# Patient Record
Sex: MALE | Race: WHITE | Employment: OTHER
[De-identification: names, ages, dates, MRNs, and addresses within clinical notes are randomized per-mention and may not be internally consistent; named-entity substitution may affect disease eponyms.]

---

## 2023-02-15 ENCOUNTER — HOSPITAL ENCOUNTER (EMERGENCY)
Facility: HOSPITAL | Age: 28
Discharge: HOME | End: 2023-02-16
Attending: EMERGENCY MEDICINE | Admitting: EMERGENCY MEDICINE
Payer: OTHER GOVERNMENT

## 2023-02-15 DIAGNOSIS — E86.0 DEHYDRATION: ICD-10-CM

## 2023-02-15 DIAGNOSIS — R10.13 ABDOMINAL PAIN, EPIGASTRIC: Primary | ICD-10-CM

## 2023-02-15 LAB
ALBUMIN SERPL-MCNC: 4.9 G/DL (ref 3.4–5)
ALBUMIN/GLOB SERPL: 1.4 (ref 0.8–1.7)
ALP SERPL-CCNC: 89 U/L (ref 45–117)
ALT SERPL-CCNC: 42 U/L (ref 16–61)
ANION GAP SERPL CALC-SCNC: 6 MMOL/L (ref 3–18)
APPEARANCE UR: CLEAR
AST SERPL-CCNC: 16 U/L (ref 10–38)
BASOPHILS # BLD: 0 K/UL (ref 0–0.1)
BASOPHILS NFR BLD: 0 % (ref 0–2)
BILIRUB SERPL-MCNC: 1.8 MG/DL (ref 0.2–1)
BILIRUB UR QL: NEGATIVE
BUN SERPL-MCNC: 11 MG/DL (ref 7–18)
BUN/CREAT SERPL: 10 (ref 12–20)
CALCIUM SERPL-MCNC: 10 MG/DL (ref 8.5–10.1)
CHLORIDE SERPL-SCNC: 106 MMOL/L (ref 100–111)
CO2 SERPL-SCNC: 30 MMOL/L (ref 21–32)
COLOR UR: YELLOW
CREAT SERPL-MCNC: 1.08 MG/DL (ref 0.6–1.3)
DIFFERENTIAL METHOD BLD: ABNORMAL
EOSINOPHIL # BLD: 0 K/UL (ref 0–0.4)
EOSINOPHIL NFR BLD: 1 % (ref 0–5)
ERYTHROCYTE [DISTWIDTH] IN BLOOD BY AUTOMATED COUNT: 13 % (ref 11.6–14.5)
GLOBULIN SER CALC-MCNC: 3.4 G/DL (ref 2–4)
GLUCOSE SERPL-MCNC: 91 MG/DL (ref 74–99)
GLUCOSE UR STRIP.AUTO-MCNC: NEGATIVE MG/DL
HCT VFR BLD AUTO: 50.4 % (ref 36–48)
HGB BLD-MCNC: 17 G/DL (ref 13–16)
HGB UR QL STRIP: NEGATIVE
IMM GRANULOCYTES # BLD AUTO: 0 K/UL (ref 0–0.04)
IMM GRANULOCYTES NFR BLD AUTO: 1 % (ref 0–0.5)
KETONES UR QL STRIP.AUTO: NEGATIVE MG/DL
LEUKOCYTE ESTERASE UR QL STRIP.AUTO: NEGATIVE
LIPASE SERPL-CCNC: 87 U/L (ref 73–393)
LYMPHOCYTES # BLD: 1.9 K/UL (ref 0.9–3.6)
LYMPHOCYTES NFR BLD: 25 % (ref 21–52)
MAGNESIUM SERPL-MCNC: 2.2 MG/DL (ref 1.6–2.6)
MCH RBC QN AUTO: 29 PG (ref 24–34)
MCHC RBC AUTO-ENTMCNC: 33.7 G/DL (ref 31–37)
MCV RBC AUTO: 85.9 FL (ref 78–100)
MONOCYTES # BLD: 0.8 K/UL (ref 0.05–1.2)
MONOCYTES NFR BLD: 11 % (ref 3–10)
NEUTS SEG # BLD: 4.7 K/UL (ref 1.8–8)
NEUTS SEG NFR BLD: 62 % (ref 40–73)
NITRITE UR QL STRIP.AUTO: NEGATIVE
NRBC # BLD: 0 K/UL (ref 0–0.01)
NRBC BLD-RTO: 0 PER 100 WBC
PH UR STRIP: 7.5 (ref 5–8)
PLATELET # BLD AUTO: 264 K/UL (ref 135–420)
PMV BLD AUTO: 10.9 FL (ref 9.2–11.8)
POTASSIUM SERPL-SCNC: 3.8 MMOL/L (ref 3.5–5.5)
PROT SERPL-MCNC: 8.3 G/DL (ref 6.4–8.2)
PROT UR STRIP-MCNC: NEGATIVE MG/DL
RBC # BLD AUTO: 5.87 M/UL (ref 4.35–5.65)
SODIUM SERPL-SCNC: 142 MMOL/L (ref 136–145)
SP GR UR REFRACTOMETRY: 1.01 (ref 1–1.03)
UROBILINOGEN UR QL STRIP.AUTO: 0.2 EU/DL (ref 0.2–1)
WBC # BLD AUTO: 7.5 K/UL (ref 4.6–13.2)

## 2023-02-15 PROCEDURE — 99284 EMERGENCY DEPT VISIT MOD MDM: CPT

## 2023-02-15 PROCEDURE — 2580000003 HC RX 258: Performed by: EMERGENCY MEDICINE

## 2023-02-15 PROCEDURE — 96360 HYDRATION IV INFUSION INIT: CPT

## 2023-02-15 PROCEDURE — 85025 COMPLETE CBC W/AUTO DIFF WBC: CPT

## 2023-02-15 PROCEDURE — 83735 ASSAY OF MAGNESIUM: CPT

## 2023-02-15 PROCEDURE — 80053 COMPREHEN METABOLIC PANEL: CPT

## 2023-02-15 PROCEDURE — 83690 ASSAY OF LIPASE: CPT

## 2023-02-15 PROCEDURE — 81003 URINALYSIS AUTO W/O SCOPE: CPT

## 2023-02-15 RX ORDER — 0.9 % SODIUM CHLORIDE 0.9 %
1000 INTRAVENOUS SOLUTION INTRAVENOUS ONCE
Status: DISCONTINUED | OUTPATIENT
Start: 2023-02-16 | End: 2023-02-15

## 2023-02-15 RX ORDER — ONDANSETRON 2 MG/ML
4 INJECTION INTRAMUSCULAR; INTRAVENOUS
Status: DISCONTINUED | OUTPATIENT
Start: 2023-02-15 | End: 2023-02-16 | Stop reason: HOSPADM

## 2023-02-15 RX ORDER — MORPHINE SULFATE 2 MG/ML
2 INJECTION, SOLUTION INTRAMUSCULAR; INTRAVENOUS
Status: DISCONTINUED | OUTPATIENT
Start: 2023-02-15 | End: 2023-02-16 | Stop reason: HOSPADM

## 2023-02-15 RX ORDER — 0.9 % SODIUM CHLORIDE 0.9 %
1000 INTRAVENOUS SOLUTION INTRAVENOUS ONCE
Status: COMPLETED | OUTPATIENT
Start: 2023-02-15 | End: 2023-02-15

## 2023-02-15 RX ADMIN — SODIUM CHLORIDE 1000 ML: 9 INJECTION, SOLUTION INTRAVENOUS at 20:29

## 2023-02-15 ASSESSMENT — ENCOUNTER SYMPTOMS
SHORTNESS OF BREATH: 0
VOMITING: 0
ALLERGIC/IMMUNOLOGIC NEGATIVE: 1
EYES NEGATIVE: 1
BLOOD IN STOOL: 0
ABDOMINAL PAIN: 1
DIARRHEA: 0
CONSTIPATION: 0

## 2023-02-15 ASSESSMENT — LIFESTYLE VARIABLES
HOW MANY STANDARD DRINKS CONTAINING ALCOHOL DO YOU HAVE ON A TYPICAL DAY: PATIENT DOES NOT DRINK
HOW OFTEN DO YOU HAVE A DRINK CONTAINING ALCOHOL: NEVER

## 2023-02-16 VITALS
HEART RATE: 68 BPM | DIASTOLIC BLOOD PRESSURE: 81 MMHG | HEIGHT: 70 IN | TEMPERATURE: 97.5 F | BODY MASS INDEX: 23.77 KG/M2 | RESPIRATION RATE: 18 BRPM | SYSTOLIC BLOOD PRESSURE: 133 MMHG | WEIGHT: 166 LBS | OXYGEN SATURATION: 98 %

## 2023-02-16 ASSESSMENT — PAIN - FUNCTIONAL ASSESSMENT: PAIN_FUNCTIONAL_ASSESSMENT: NONE - DENIES PAIN

## 2023-02-16 NOTE — ED NOTES
Pt d/c with instructions and ambulatory. Pt instructed to f/u with gi; no additional questions at this time. No redness or swelling at iv site. Pt vss and documented.       Sarah Kelley RN  02/16/23 3661

## 2023-02-16 NOTE — ED NOTES
Pt resting on stretcher talking with family;nad noted. Pt vs updated.  Pt awaiting re eval.      Mati John RN  02/15/23 2123

## 2023-02-16 NOTE — ED TRIAGE NOTES
Pt arrived with c.o \"feeling off\", pt describes his symptoms as \"feels foggy, sort of dizzy and gut pain\". Pt reports he went to [facility] and had pressure checked and it was \"high, 145/118\", BP in triage 175/96. Denies CP/SOB  Denies HA.

## 2023-02-16 NOTE — ED NOTES
Pt states he feels better after receiving ns ivf bolus.  Pt vs documented      Delgado Sanchez RN  02/15/23 5072

## 2023-02-16 NOTE — ED PROVIDER NOTES
[facility] EMERGENCY DEPT  EMERGENCY DEPARTMENT ENCOUNTER      Pt Name: Earnest Acevedo  MRN: [id number]  [place] 1995  Date of evaluation: 2/15/2023  Provider: Anabella Martinez [facility]       Chief Complaint   Patient presents with    Other         HISTORY OF PRESENT ILLNESS   (Location/Symptom, Timing/Onset, Context/Setting, Quality, Duration, Modifying Factors, Severity)  Note limiting factors. Earnest Acevedo is a 32 y.o. male who presents to the emergency department complaint of several days of abdominal pain nauseousness decreased appetite slightly and then he said he found out that his blood pressure was \"through the roof\" today. Denies history of hypertension prior abdominal surgeries nausea vomiting or diarrheal stools. He had low potassium once previously when he self felt somewhat similar about a year and a half ago. Denies melena hematochezia traumatic tobacco alcohol or illicits. Denies dysuria. HPI    Nursing Notes were reviewed. REVIEW OF SYSTEMS    (2-9 systems for level 4, 10 or more for level 5)     Review of Systems   Constitutional:  Negative for fever. HENT: Negative. Eyes: Negative. Respiratory:  Negative for shortness of breath. Cardiovascular:  Negative for chest pain. Gastrointestinal:  Positive for abdominal pain. Negative for blood in stool, constipation, diarrhea and vomiting. Endocrine: Negative. Genitourinary:  Negative for flank pain and frequency. Musculoskeletal: Negative. Skin: Negative. Allergic/Immunologic: Negative. Neurological: Negative. Hematological:  Does not bruise/bleed easily. Psychiatric/Behavioral: Negative. Except as noted above the remainder of the review of systems was reviewed and negative. PAST MEDICAL HISTORY   No past medical history on file. SURGICAL HISTORY     No past surgical history on file.       CURRENT MEDICATIONS       Previous Medications    No medications on file ALLERGIES     Patient has no known allergies. FAMILY HISTORY     No family history on file. SOCIAL HISTORY       Social History     Socioeconomic History    Marital status:        SCREENINGS         [place] Coma Scale  Eye Opening: Spontaneous  Best Verbal Response: Oriented  Best Motor Response: Obeys commands  Gonzalo Coma Scale Score: 15                     CIWA Assessment  BP: (!) 151/82  Heart Rate: 82                 PHYSICAL EXAM    (up to 7 for level 4, 8 or more for level 5)     ED Triage Vitals [02/15/23 1909]   BP Temp Temp Source Heart Rate Resp SpO2 Height Weight   (!) 175/96 97.5 °F (36.4 °C) Temporal 98 20 100 % 5' 10\" (1.778 m) 166 lb (75.3 kg)       Physical Exam  Vitals and nursing note reviewed. Constitutional:       General: He is in acute distress. Appearance: Normal appearance. He is normal weight. HENT:      Head: Normocephalic. Nose: Nose normal.      Mouth/Throat:      Mouth: Mucous membranes are moist.   Eyes:      Extraocular Movements: Extraocular movements intact. Cardiovascular:      Rate and Rhythm: Normal rate and regular rhythm. Pulses: Normal pulses. Heart sounds: Normal heart sounds. Pulmonary:      Effort: Pulmonary effort is normal.      Breath sounds: Normal breath sounds. Abdominal:      Tenderness: There is abdominal tenderness. Comments: Epigastric, right lower, left lower quadrant tenderness. Musculoskeletal:         General: No deformity. Cervical back: Normal range of motion and neck supple. No rigidity. Skin:     General: Skin is warm. Findings: No rash. Neurological:      Mental Status: He is alert and oriented to person, place, and time. Psychiatric:         Mood and Affect: Mood normal.         Behavior: Behavior normal.         Thought Content:  Thought content normal.         Judgment: Judgment normal.       DIAGNOSTIC RESULTS     EKG: All EKG's are interpreted by the Emergency Department Physician who either signs or Co-signs this chart in the absence of a cardiologist.      RADIOLOGY:   Non-plain film images such as CT, Ultrasound and MRI are read by the radiologist. Plain radiographic images are visualized and preliminarily interpreted by the emergency physician with the below findings:    Interpretation per the Radiologist below, if available at the time of this note:    No orders to display         ED BEDSIDE ULTRASOUND:   Performed by ED Physician - none    LABS:  Labs Reviewed   CBC WITH AUTO DIFFERENTIAL - Abnormal; Notable for the following components:       Result Value    RBC 5.87 (*)     Hemoglobin 17.0 (*)     Hematocrit 50.4 (*)     Monocytes 11 (*)     Immature Granulocytes 1 (*)     All other components within normal limits   COMPREHENSIVE METABOLIC PANEL - Abnormal; Notable for the following components:    Bun/Cre Ratio 10 (*)     Total Bilirubin 1.8 (*)     Total Protein 8.3 (*)     All other components within normal limits   LIPASE   MAGNESIUM   URINALYSIS       All other labs were within normal range or not returned as of this dictation. EMERGENCY DEPARTMENT COURSE and DIFFERENTIAL DIAGNOSIS/MDM:   Vitals:    Vitals:    02/15/23 2005 02/15/23 2007 02/15/23 2124 02/15/23 2239   BP: (!) 179/82 (!) 147/84  (!) 151/82   Pulse: (!) 101 87  82   Resp: 18 18 18 18   Temp:       TempSrc:       SpO2: 99% 98% 98% 98%   Weight: 166 lb (75.3 kg)      Height: 5' 10\" (1.778 m)            Medical Decision Making  Amount and/or Complexity of Data Reviewed  Labs: ordered. Radiology: ordered. Risk  Prescription drug management. Pt with stable bilirubin and potassium; no longer having pain s/pIVF. Said last year he had a CT scan and an ultrasound evaluating his abdomen and gallbladder both were normal.  His LFTs are otherwise unremarkable. Patient is no longer having any abdominal discomfort.   He describes the pain is radiating up into his chest from his abdomen and has Protonix available to him he just has not been taking it. Encouraged him to take omeprazole follow-up with GI and patient to be discharged PT CT scan has been removed as patient is no longer symptomatic. Discussed triggers for peptic ulcer disease and reflux which patient likely has including stressors as triggers. Has had endoscopic. REASSESSMENT          CRITICAL CARE TIME       CONSULTS:  None    PROCEDURES:  Unless otherwise noted below, none     Procedures      FINAL IMPRESSION      1. Abdominal pain, epigastric    2. Dehydration          DISPOSITION/PLAN   DISPOSITION Decision To Discharge 02/15/2023 11:57:29 PM      PATIENT REFERRED TO:  Erik Spangler MD  [address] [place] [address] [address] [facility] [phone]    Schedule an appointment as soon as possible for a visit in 2 days      [facility] EMERGENCY DEPT  [address] [place] [address]  [phone]    If symptoms worsen return immediately    DISCHARGE MEDICATIONS:  New Prescriptions    No medications on file     Controlled Substances Monitoring:     No flowsheet data found.     (Please note that portions of this note were completed with a voice recognition program.  Efforts were made to edit the dictations but occasionally words are mis-transcribed.)    JOCELYN Coats (electronically signed)  Attending Emergency Physician            Christiane Valencia [place]  02/16/23 [address] [facility]  02/16/23 0003

## 2024-10-02 ENCOUNTER — HOSPITAL ENCOUNTER (OUTPATIENT)
Facility: HOSPITAL | Age: 29
Setting detail: OBSERVATION
Discharge: HOME | End: 2024-10-03
Attending: EMERGENCY MEDICINE | Admitting: INTERNAL MEDICINE
Payer: OTHER GOVERNMENT

## 2024-10-02 ENCOUNTER — APPOINTMENT (OUTPATIENT)
Facility: HOSPITAL | Age: 29
End: 2024-10-02
Payer: OTHER GOVERNMENT

## 2024-10-02 DIAGNOSIS — I63.9 CEREBROVASCULAR ACCIDENT (CVA), UNSPECIFIED MECHANISM (HCC): ICD-10-CM

## 2024-10-02 DIAGNOSIS — R20.0 NUMBNESS AND TINGLING: Primary | ICD-10-CM

## 2024-10-02 DIAGNOSIS — R20.2 NUMBNESS AND TINGLING: Primary | ICD-10-CM

## 2024-10-02 LAB
ALBUMIN SERPL-MCNC: 4.5 G/DL (ref 3.5–5)
ALBUMIN/GLOB SERPL: 1.4 (ref 1.1–2.2)
ALP SERPL-CCNC: 85 U/L (ref 45–117)
ALT SERPL-CCNC: 30 U/L (ref 12–78)
ANION GAP SERPL CALC-SCNC: 9 MMOL/L (ref 2–12)
AST SERPL W P-5'-P-CCNC: 12 U/L (ref 15–37)
BASOPHILS # BLD: 0 K/UL (ref 0–0.1)
BASOPHILS NFR BLD: 0 % (ref 0–1)
BILIRUB SERPL-MCNC: 2.2 MG/DL (ref 0.2–1)
BUN SERPL-MCNC: 10 MG/DL (ref 6–20)
BUN/CREAT SERPL: 10 (ref 12–20)
CA-I BLD-MCNC: 9.3 MG/DL (ref 8.5–10.1)
CHLORIDE SERPL-SCNC: 104 MMOL/L (ref 97–108)
CO2 SERPL-SCNC: 25 MMOL/L (ref 21–32)
CREAT SERPL-MCNC: 1.02 MG/DL (ref 0.7–1.3)
DIFFERENTIAL METHOD BLD: NORMAL
EKG ATRIAL RATE: 111 BPM
EKG DIAGNOSIS: NORMAL
EKG P AXIS: 77 DEGREES
EKG P-R INTERVAL: 168 MS
EKG Q-T INTERVAL: 334 MS
EKG QRS DURATION: 86 MS
EKG QTC CALCULATION (BAZETT): 454 MS
EKG R AXIS: 82 DEGREES
EKG T AXIS: 2 DEGREES
EKG VENTRICULAR RATE: 111 BPM
EOSINOPHIL # BLD: 0.1 K/UL (ref 0–0.4)
EOSINOPHIL NFR BLD: 1 % (ref 0–7)
ERYTHROCYTE [DISTWIDTH] IN BLOOD BY AUTOMATED COUNT: 13.1 % (ref 11.5–14.5)
GLOBULIN SER CALC-MCNC: 3.3 G/DL (ref 2–4)
GLUCOSE BLD STRIP.AUTO-MCNC: 109 MG/DL (ref 65–100)
GLUCOSE SERPL-MCNC: 121 MG/DL (ref 65–100)
HCT VFR BLD AUTO: 45.6 % (ref 36.6–50.3)
HGB BLD-MCNC: 15.7 G/DL (ref 12.1–17)
IMM GRANULOCYTES # BLD AUTO: 0 K/UL (ref 0–0.04)
IMM GRANULOCYTES NFR BLD AUTO: 0 % (ref 0–0.5)
INR PPP: 1 (ref 0.9–1.1)
LYMPHOCYTES # BLD: 2.6 K/UL (ref 0.8–3.5)
LYMPHOCYTES NFR BLD: 39 % (ref 12–49)
MCH RBC QN AUTO: 28.9 PG (ref 26–34)
MCHC RBC AUTO-ENTMCNC: 34.4 G/DL (ref 30–36.5)
MCV RBC AUTO: 83.8 FL (ref 80–99)
MONOCYTES # BLD: 0.5 K/UL (ref 0–1)
MONOCYTES NFR BLD: 8 % (ref 5–13)
NEUTS SEG # BLD: 3.5 K/UL (ref 1.8–8)
NEUTS SEG NFR BLD: 52 % (ref 32–75)
NRBC # BLD: 0 K/UL (ref 0–0.01)
NRBC BLD-RTO: 0 PER 100 WBC
PERFORMED BY:: ABNORMAL
PLATELET # BLD AUTO: 230 K/UL (ref 150–400)
PMV BLD AUTO: 10.6 FL (ref 8.9–12.9)
POTASSIUM SERPL-SCNC: 3.7 MMOL/L (ref 3.5–5.1)
PROT SERPL-MCNC: 7.8 G/DL (ref 6.4–8.2)
PROTHROMBIN TIME: 13.7 SEC (ref 11.9–14.6)
RBC # BLD AUTO: 5.44 M/UL (ref 4.1–5.7)
SODIUM SERPL-SCNC: 138 MMOL/L (ref 136–145)
TROPONIN I SERPL HS-MCNC: 5 NG/L (ref 0–76)
WBC # BLD AUTO: 6.8 K/UL (ref 4.1–11.1)

## 2024-10-02 PROCEDURE — G0378 HOSPITAL OBSERVATION PER HR: HCPCS

## 2024-10-02 PROCEDURE — 85610 PROTHROMBIN TIME: CPT

## 2024-10-02 PROCEDURE — 80053 COMPREHEN METABOLIC PANEL: CPT

## 2024-10-02 PROCEDURE — 36415 COLL VENOUS BLD VENIPUNCTURE: CPT

## 2024-10-02 PROCEDURE — 85025 COMPLETE CBC W/AUTO DIFF WBC: CPT

## 2024-10-02 PROCEDURE — 70450 CT HEAD/BRAIN W/O DYE: CPT

## 2024-10-02 PROCEDURE — 70498 CT ANGIOGRAPHY NECK: CPT

## 2024-10-02 PROCEDURE — 99285 EMERGENCY DEPT VISIT HI MDM: CPT

## 2024-10-02 PROCEDURE — 93005 ELECTROCARDIOGRAM TRACING: CPT | Performed by: EMERGENCY MEDICINE

## 2024-10-02 PROCEDURE — 2580000003 HC RX 258: Performed by: INTERNAL MEDICINE

## 2024-10-02 PROCEDURE — 82962 GLUCOSE BLOOD TEST: CPT

## 2024-10-02 PROCEDURE — 93005 ELECTROCARDIOGRAM TRACING: CPT | Performed by: INTERNAL MEDICINE

## 2024-10-02 PROCEDURE — 6370000000 HC RX 637 (ALT 250 FOR IP): Performed by: EMERGENCY MEDICINE

## 2024-10-02 PROCEDURE — 0042T CT BRAIN PERFUSION: CPT

## 2024-10-02 PROCEDURE — 6370000000 HC RX 637 (ALT 250 FOR IP): Performed by: INTERNAL MEDICINE

## 2024-10-02 PROCEDURE — 6360000004 HC RX CONTRAST MEDICATION: Performed by: EMERGENCY MEDICINE

## 2024-10-02 PROCEDURE — 84484 ASSAY OF TROPONIN QUANT: CPT

## 2024-10-02 RX ORDER — CLOPIDOGREL BISULFATE 75 MG/1
75 TABLET ORAL DAILY
Status: DISCONTINUED | OUTPATIENT
Start: 2024-10-03 | End: 2024-10-03 | Stop reason: HOSPADM

## 2024-10-02 RX ORDER — POLYETHYLENE GLYCOL 3350 17 G/17G
17 POWDER, FOR SOLUTION ORAL DAILY PRN
Status: DISCONTINUED | OUTPATIENT
Start: 2024-10-02 | End: 2024-10-03 | Stop reason: HOSPADM

## 2024-10-02 RX ORDER — SODIUM CHLORIDE 0.9 % (FLUSH) 0.9 %
5-40 SYRINGE (ML) INJECTION PRN
Status: DISCONTINUED | OUTPATIENT
Start: 2024-10-02 | End: 2024-10-03 | Stop reason: HOSPADM

## 2024-10-02 RX ORDER — ENOXAPARIN SODIUM 100 MG/ML
40 INJECTION SUBCUTANEOUS DAILY
Status: DISCONTINUED | OUTPATIENT
Start: 2024-10-02 | End: 2024-10-03 | Stop reason: HOSPADM

## 2024-10-02 RX ORDER — LABETALOL HYDROCHLORIDE 5 MG/ML
10 INJECTION, SOLUTION INTRAVENOUS EVERY 10 MIN PRN
Status: DISCONTINUED | OUTPATIENT
Start: 2024-10-02 | End: 2024-10-03 | Stop reason: HOSPADM

## 2024-10-02 RX ORDER — ATORVASTATIN CALCIUM 40 MG/1
80 TABLET, FILM COATED ORAL NIGHTLY
Status: DISCONTINUED | OUTPATIENT
Start: 2024-10-02 | End: 2024-10-03 | Stop reason: HOSPADM

## 2024-10-02 RX ORDER — SODIUM CHLORIDE 0.9 % (FLUSH) 0.9 %
5-40 SYRINGE (ML) INJECTION EVERY 12 HOURS SCHEDULED
Status: DISCONTINUED | OUTPATIENT
Start: 2024-10-02 | End: 2024-10-03 | Stop reason: HOSPADM

## 2024-10-02 RX ORDER — IOPAMIDOL 755 MG/ML
100 INJECTION, SOLUTION INTRAVASCULAR
Status: COMPLETED | OUTPATIENT
Start: 2024-10-02 | End: 2024-10-02

## 2024-10-02 RX ORDER — ONDANSETRON 2 MG/ML
4 INJECTION INTRAMUSCULAR; INTRAVENOUS EVERY 6 HOURS PRN
Status: DISCONTINUED | OUTPATIENT
Start: 2024-10-02 | End: 2024-10-03 | Stop reason: HOSPADM

## 2024-10-02 RX ORDER — SODIUM CHLORIDE 9 MG/ML
INJECTION, SOLUTION INTRAVENOUS PRN
Status: DISCONTINUED | OUTPATIENT
Start: 2024-10-02 | End: 2024-10-03 | Stop reason: HOSPADM

## 2024-10-02 RX ORDER — ASPIRIN 325 MG
325 TABLET ORAL
Status: COMPLETED | OUTPATIENT
Start: 2024-10-02 | End: 2024-10-02

## 2024-10-02 RX ORDER — ONDANSETRON 4 MG/1
4 TABLET, ORALLY DISINTEGRATING ORAL EVERY 8 HOURS PRN
Status: DISCONTINUED | OUTPATIENT
Start: 2024-10-02 | End: 2024-10-03 | Stop reason: HOSPADM

## 2024-10-02 RX ORDER — CLOPIDOGREL 300 MG/1
300 TABLET, FILM COATED ORAL
Status: COMPLETED | OUTPATIENT
Start: 2024-10-02 | End: 2024-10-02

## 2024-10-02 RX ADMIN — CLOPIDOGREL BISULFATE 300 MG: 300 TABLET, FILM COATED ORAL at 17:35

## 2024-10-02 RX ADMIN — IOPAMIDOL 100 ML: 755 INJECTION, SOLUTION INTRAVENOUS at 14:43

## 2024-10-02 RX ADMIN — ASPIRIN 325 MG: 325 TABLET ORAL at 17:35

## 2024-10-02 RX ADMIN — SODIUM CHLORIDE, PRESERVATIVE FREE 10 ML: 5 INJECTION INTRAVENOUS at 21:19

## 2024-10-02 RX ADMIN — ATORVASTATIN CALCIUM 80 MG: 40 TABLET, FILM COATED ORAL at 21:18

## 2024-10-02 ASSESSMENT — PAIN SCALES - GENERAL
PAINLEVEL_OUTOF10: 0
PAINLEVEL_OUTOF10: 0
PAINLEVEL_OUTOF10: 6

## 2024-10-02 ASSESSMENT — ENCOUNTER SYMPTOMS
BACK PAIN: 0
SINUS PRESSURE: 0
TROUBLE SWALLOWING: 0
EYE REDNESS: 0
VOMITING: 0
BLOOD IN STOOL: 0
SHORTNESS OF BREATH: 0
NAUSEA: 0
COUGH: 0
SORE THROAT: 0
ABDOMINAL PAIN: 1
PHOTOPHOBIA: 0

## 2024-10-02 NOTE — H&P
History & Physical    Primary Care Provider: File, Not On, MD  Source of Information: Patient/family     Chief complaint:   Chief Complaint   Patient presents with    Numbness        History of Presenting Illness:   Steven Fabian is a 29 y.o. man without significant past medical history except [place] disease with a chronically elevated total bili, not on any medications who presented to the emergency room with paresthesias.  Patient says the first episode happened earlier today where he noticed some vision blurring.  He said when he closed one eye his vision was fine when he close he otherwise vision was fine but with both eyes open that it seemed blurry but he did not see double.  After this occurred he noted some lip numbness he says only the upper lip became numb and it was on both sides of the face and then he had the onset of paresthesias down his right arm no obvious apraxia or weakness and no involvement of his leg.  This lasted for about 10 minutes but then reoccurred later here in the ED lasting about 5 minutes.  Currently he is asymptomatic.  In the ED he was afebrile hemodynamically stable and not hypoxic.  Blood pressure is trending on the high side with systolics in the 150s to 160s and he does have tachycardia in the 100-110s.  Lab work showed a sodium of 142, potassium of 3.8, creatinine of 1.0, glucose of 109, total bili of 1.8.  White count of 6.8, hemoglobin of 15.  Head CT is noted below. I discussed case with ED physician and will place patient in observation for evaluation for stroke     Review of Systems:  Review of Systems   Constitutional:  Negative for activity change, appetite change, fatigue and fever.   HENT:  Negative for congestion, mouth sores, sinus pressure, sore throat and trouble swallowing.    Eyes:  Positive for visual disturbance. Negative for photophobia and redness.   Respiratory:  Negative for cough and shortness of breath.    Cardiovascular:  Positive for    occipital lobes. This is of uncertain significance, may be artifactual. There is   no correlating vascular abnormality, no large vessel occlusion, no   hemodynamically significant stenosis.      Electronically signed by MELISA PAYNE      CT HEAD WO CONTRAST   Final Result         No acute abnormality      Electronically signed by Robert Plata             Discussion/Medical Decision Making: Patient with numerous medical comorbidities, each with increased risk for mortality and morbidity if left untreated.   I have reviewed patient's presenting subjective and objective findings, as well as all laboratory studies, imaging studies, and vital signs to date as well as treatment rendered  and patient's response to those treatments.  In addition, prior medical, surgical and relevant social and family histories were reviewed.    I have discussed patient's presentation/findings and clinical course to date with ED provider. Given the patient's current clinical presentation, I have a high level of concern for decompensation if discharged from the emergency department and that patient warrants admission to hospital.     Assessment/Plan     Paresthesias of face and right arm-rule out stroke versus TIA  Intermittent brief but recurrent symptoms at least twice here in the ED.  Currently asymptomatic  Head CT/CTA with a large apparent perfusion defect in left frontal parietal and occipital lobes versus artifact  With no correlating vascular abnormality or large vessel occlusion or hemodynamically significant stenosis  Patient has been started on aspirin and Plavix as well as high intensity statin  Plan for MRI of brain  Teleneurology consultation completed  Echocardiogram to rule out potential causes of stroke including cardiac thrombus and PFO  No history of arrhythmias but does describe frequent episodes of palpitations-continue telemetry and consider outpatient heart monitor pending clinical course  Neurochecks per

## 2024-10-02 NOTE — ED TRIAGE NOTES
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

## 2024-10-02 NOTE — ED PROVIDER NOTES
[facility] EMERGENCY DEPT  EMERGENCY DEPARTMENT HISTORY AND PHYSICAL EXAM      Date: 10/2/2024  Patient Name: Steven Fabian  MRN: [id number]  Birthdate 1995  Date of evaluation: 10/2/2024  Provider: Sebastian Lyle MD   Note Started: 4:20 PM EDT 10/2/24    HISTORY OF PRESENT ILLNESS     Chief Complaint   Patient presents with    Numbness       History Provided By: Patient    HPI: Steven Fabian is a 29 y.o. male presents for evaluation of numbness and tingling that started in his right upper extremity at 1:30 PM today.  Patient also states he noted some numbness and tingling on his upper lip bilaterally.  Denies any weakness complaint.  Denies fevers or chills, denies nausea or vomiting, denies chest pain or shortness of breath.    PAST MEDICAL HISTORY   Past Medical History:  No past medical history on file.    Past Surgical History:  No past surgical history on file.    Family History:  No family history on file.    Social History:       Allergies:  No Known Allergies    PCP: File, Not On, MD    Current Meds:   Current Facility-Administered Medications   Medication Dose Route Frequency Provider Last Rate Last Admin    aspirin tablet 325 mg  325 mg Oral NOW Sebastian Lyle MD        clopidogrel (PLAVIX) tablet 300 mg  300 mg Oral NOW Sebastian Lyle MD         No current outpatient medications on file.       Social Determinants of Health:   Social Determinants of Health     Tobacco Use: High Risk (1/20/2022)    Received from [facility]    Patient History     Smoking Tobacco Use: Never     Smokeless Tobacco Use: Current     Passive Exposure: Not on file   Alcohol Use: Not At Risk (10/2/2024)    AUDIT-C     Frequency of Alcohol Consumption: Never     Average Number of Drinks: Patient does not drink     Frequency of Binge Drinking: Never   Financial Resource Strain: Not on file   Food Insecurity: Not on file   Transportation Needs: Not on file   Physical Activity: Not on

## 2024-10-02 NOTE — PROGRESS NOTES
4 Eyes Skin Assessment     NAME:  Steven Fabian  YOB: 1995  MEDICAL RECORD NUMBER:  [id number]    The patient is being assessed for  Admission    I agree that at least one RN has performed a thorough Head to Toe Skin Assessment on the patient. ALL assessment sites listed below have been assessed.      Areas assessed by both nurses:    Head, Face, Ears, Shoulders, Back, Chest, Arms, Elbows, Hands, Sacrum. Buttock, Coccyx, Ischium, Legs. Feet and Heels, and Under Medical Devices         Does the Patient have a Wound? No noted wound(s)       Daljit Prevention initiated by RN: No  Wound Care Orders initiated by RN: No    Pressure Injury (Stage 3,4, Unstageable, DTI, NWPT, and Complex wounds) if present, place Wound referral order by RN under : No    New Ostomies, if present place, Ostomy referral order under : No     Nurse 1 eSignature: Electronically signed by CHADWICK DUPONT RN on 10/2/24 at 6:23 PM EDT    **SHARE this note so that the co-signing nurse can place an eSignature**    Nurse 2 eSignature: {Esignature:[id number]}

## 2024-10-02 NOTE — ED NOTES
ED TO INPATIENT SBAR HANDOFF    Patient Name: Steven Fabian   Preferred Name: Steven Gibson  : 1995  29 y.o.   Family/Caregiver Present: no   Code Status Order: No Order  PO Status: NPO: No  Telemetry Order: Yes  C-SSRS: Risk of Suicide: No Risk  Sitter no   Restraints:     Sepsis Risk Score      Situation  Chief Complaint   Patient presents with    Numbness     Brief Description of Patient's Condition: Patient arrives aox4 c/o right side numbness and right mouth numbness. Not TNK canidate NIH negative. Patient ambulates by self and with wife at bedside.  Mental Status: oriented, alert, coherent, logical, thought processes intact, and able to concentrate and follow conversation  Arrived from:Home  Imaging:   CTA HEAD NECK W CONTRAST   Final Result      1. Large apparent perfusion defect in the left posterior frontal, parietal, and   occipital lobes. This is of uncertain significance, may be artifactual. There is   no correlating vascular abnormality, no large vessel occlusion, no   hemodynamically significant stenosis.      Electronically signed by MELISA PAYNE      CT BRAIN PERFUSION   Final Result      1. Large apparent perfusion defect in the left posterior frontal, parietal, and   occipital lobes. This is of uncertain significance, may be artifactual. There is   no correlating vascular abnormality, no large vessel occlusion, no   hemodynamically significant stenosis.      Electronically signed by MELISA PAYNE      CT HEAD WO CONTRAST   Final Result         No acute abnormality      Electronically signed by Robert Plata        Abnormal labs:   Abnormal Labs Reviewed   POCT GLUCOSE - Abnormal; Notable for the following components:       Result Value    POC Glucose 109 (*)     All other components within normal limits       Background  Allergies: No Known Allergies  History: No past medical history on file.    Assessment  Vitals: MEWS Score: 2  Level of Consciousness: Alert (0)   Vitals:    10/02/24  Notification  Name of person notified and time: Ed, Placido      Electronically signed by: Electronically signed by Judith Joya RN on 10/2/2024 at 5:00 PM

## 2024-10-02 NOTE — CARE COORDINATION
10/02/24 8863   Service Assessment   Patient Orientation Alert and Oriented   Cognition Alert   History Provided By Patient   Primary Caregiver Self   Accompanied By/Relationship Wife & co-worker.   Support Systems Spouse/Significant Other;Family Members;Friends/Neighbors   Patient's Healthcare Decision Maker is: Legal Next of Kin   PCP Verified by CM Yes  (Seen by Kailee ADAN)   Last Visit to PCP Within last 3 months   Prior Functional Level Independent in ADLs/IADLs   Current Functional Level Independent in ADLs/IADLs   Can patient return to prior living arrangement Yes   Ability to make needs known: Good   Family able to assist with home care needs: Yes   Would you like for me to discuss the discharge plan with any other family members/significant others, and if so, who? Yes  (Wife Lyndsay Fabian)   Financial Resources Other (Comment)  ()   Community Resources None   CM/SW Referral Other (see comment)  (None)   Social/Functional History   Lives With Spouse   Type of Home House   Home Layout Two level   Home Access Level entry   Bathroom Shower/Tub Tub/Shower unit   Bathroom Toilet Standard   Bathroom Equipment None   Bathroom Accessibility Accessible   Home Equipment None   Receives Help From Family   ADL Assistance Independent   Homemaking Assistance Independent   Homemaking Responsibilities Yes   Ambulation Assistance Independent   Transfer Assistance Independent   Active  Yes   Occupation Full time employment   Discharge Planning   Type of Residence House   Living Arrangements Spouse/Significant Other   Current Services Prior To Admission None   Potential Assistance Needed N/A   DME Ordered? No   Potential Assistance Purchasing Medications No   Type of Home Care Services None   Patient expects to be discharged to: House   One/Two Story Residence Two story   History of falls? 0   Services At/After Discharge   Transition of Care Consult (CM Consult) Group Home   Services At/After Discharge SLP

## 2024-10-03 ENCOUNTER — APPOINTMENT (OUTPATIENT)
Facility: HOSPITAL | Age: 29
End: 2024-10-03
Payer: OTHER GOVERNMENT

## 2024-10-03 ENCOUNTER — APPOINTMENT (OUTPATIENT)
Facility: HOSPITAL | Age: 29
End: 2024-10-03
Attending: INTERNAL MEDICINE
Payer: OTHER GOVERNMENT

## 2024-10-03 VITALS
TEMPERATURE: 98.8 F | HEART RATE: 83 BPM | SYSTOLIC BLOOD PRESSURE: 158 MMHG | WEIGHT: 165 LBS | OXYGEN SATURATION: 99 % | DIASTOLIC BLOOD PRESSURE: 82 MMHG | RESPIRATION RATE: 16 BRPM | BODY MASS INDEX: 23.62 KG/M2 | HEIGHT: 70 IN

## 2024-10-03 LAB
CHOLEST SERPL-MCNC: 206 MG/DL
ECHO AO ASC DIAM: 2.7 CM
ECHO AO ASCENDING AORTA INDEX: 1.41 CM/M2
ECHO AO ROOT DIAM: 2.8 CM
ECHO AO ROOT INDEX: 1.46 CM/M2
ECHO AV AREA PEAK VELOCITY: 2.8 CM2
ECHO AV AREA VTI: 2.7 CM2
ECHO AV AREA/BSA PEAK VELOCITY: 1.5 CM2/M2
ECHO AV AREA/BSA VTI: 1.4 CM2/M2
ECHO AV MEAN GRADIENT: 4 MMHG
ECHO AV MEAN VELOCITY: 0.9 M/S
ECHO AV PEAK GRADIENT: 8 MMHG
ECHO AV PEAK VELOCITY: 1.4 M/S
ECHO AV VELOCITY RATIO: 0.71
ECHO AV VTI: 24.5 CM
ECHO BSA: 1.92 M2
ECHO EST RA PRESSURE: 3 MMHG
ECHO LA AREA 4C: 10.1 CM2
ECHO LA DIAMETER INDEX: 1.15 CM/M2
ECHO LA DIAMETER: 2.2 CM
ECHO LA MAJOR AXIS: 4 CM
ECHO LA TO AORTIC ROOT RATIO: 0.79
ECHO LA VOL MOD A4C: 20 ML (ref 18–58)
ECHO LA VOLUME INDEX MOD A4C: 10 ML/M2 (ref 16–34)
ECHO LV EDV A4C: 47 ML
ECHO LV EDV INDEX A4C: 24 ML/M2
ECHO LV EF PHYSICIAN: 65 %
ECHO LV EJECTION FRACTION A4C: 56 %
ECHO LV ESV A4C: 21 ML
ECHO LV ESV INDEX A4C: 11 ML/M2
ECHO LV FRACTIONAL SHORTENING: 42 % (ref 28–44)
ECHO LV INTERNAL DIMENSION DIASTOLE INDEX: 2.34 CM/M2
ECHO LV INTERNAL DIMENSION DIASTOLIC: 4.5 CM (ref 4.2–5.9)
ECHO LV INTERNAL DIMENSION SYSTOLIC INDEX: 1.35 CM/M2
ECHO LV INTERNAL DIMENSION SYSTOLIC: 2.6 CM
ECHO LV IVSD: 1 CM (ref 0.6–1)
ECHO LV MASS 2D: 142.9 G (ref 88–224)
ECHO LV MASS INDEX 2D: 74.4 G/M2 (ref 49–115)
ECHO LV POSTERIOR WALL DIASTOLIC: 0.9 CM (ref 0.6–1)
ECHO LV RELATIVE WALL THICKNESS RATIO: 0.4
ECHO LVOT AREA: 4.2 CM2
ECHO LVOT AV VTI INDEX: 0.65
ECHO LVOT DIAM: 2.3 CM
ECHO LVOT MEAN GRADIENT: 2 MMHG
ECHO LVOT PEAK GRADIENT: 4 MMHG
ECHO LVOT PEAK VELOCITY: 1 M/S
ECHO LVOT STROKE VOLUME INDEX: 34.6 ML/M2
ECHO LVOT SV: 66.4 ML
ECHO LVOT VTI: 16 CM
ECHO PV MAX VELOCITY: 1.6 M/S
ECHO PV MEAN GRADIENT: 4 MMHG
ECHO PV MEAN VELOCITY: 1 M/S
ECHO PV PEAK GRADIENT: 10 MMHG
ECHO PV VTI: 22.8 CM
ECHO RA AREA 4C: 10.4 CM2
ECHO RA END SYSTOLIC VOLUME APICAL 4 CHAMBER INDEX BSA: 12 ML/M2
ECHO RA VOLUME: 23 ML
ECHO RIGHT VENTRICULAR SYSTOLIC PRESSURE (RVSP): 17 MMHG
ECHO TV REGURGITANT MAX VELOCITY: 1.88 M/S
ECHO TV REGURGITANT PEAK GRADIENT: 14 MMHG
ERYTHROCYTE [DISTWIDTH] IN BLOOD BY AUTOMATED COUNT: 13.1 % (ref 11.5–14.5)
EST. AVERAGE GLUCOSE BLD GHB EST-MCNC: 97 MG/DL
HBA1C MFR BLD: 5 % (ref 4–5.6)
HCT VFR BLD AUTO: 48.3 % (ref 36.6–50.3)
HDLC SERPL-MCNC: 52 MG/DL
HDLC SERPL: 4 (ref 0–5)
HGB BLD-MCNC: 16.6 G/DL (ref 12.1–17)
LDLC SERPL CALC-MCNC: 136.6 MG/DL (ref 0–100)
LIPID PANEL: ABNORMAL
MCH RBC QN AUTO: 29 PG (ref 26–34)
MCHC RBC AUTO-ENTMCNC: 34.4 G/DL (ref 30–36.5)
MCV RBC AUTO: 84.4 FL (ref 80–99)
NRBC # BLD: 0 K/UL (ref 0–0.01)
NRBC BLD-RTO: 0 PER 100 WBC
PLATELET # BLD AUTO: 238 K/UL (ref 150–400)
PMV BLD AUTO: 10.7 FL (ref 8.9–12.9)
RBC # BLD AUTO: 5.72 M/UL (ref 4.1–5.7)
TRIGL SERPL-MCNC: 87 MG/DL
VLDLC SERPL CALC-MCNC: 17.4 MG/DL
WBC # BLD AUTO: 8.3 K/UL (ref 4.1–11.1)

## 2024-10-03 PROCEDURE — 6360000002 HC RX W HCPCS: Performed by: INTERNAL MEDICINE

## 2024-10-03 PROCEDURE — G0378 HOSPITAL OBSERVATION PER HR: HCPCS

## 2024-10-03 PROCEDURE — 70551 MRI BRAIN STEM W/O DYE: CPT

## 2024-10-03 PROCEDURE — 6370000000 HC RX 637 (ALT 250 FOR IP): Performed by: INTERNAL MEDICINE

## 2024-10-03 PROCEDURE — 96372 THER/PROPH/DIAG INJ SC/IM: CPT

## 2024-10-03 PROCEDURE — 36415 COLL VENOUS BLD VENIPUNCTURE: CPT

## 2024-10-03 PROCEDURE — 80061 LIPID PANEL: CPT

## 2024-10-03 PROCEDURE — 2580000003 HC RX 258: Performed by: INTERNAL MEDICINE

## 2024-10-03 PROCEDURE — 83036 HEMOGLOBIN GLYCOSYLATED A1C: CPT

## 2024-10-03 PROCEDURE — G0426 INPT/ED TELECONSULT50: HCPCS | Performed by: SPECIALIST

## 2024-10-03 PROCEDURE — 93306 TTE W/DOPPLER COMPLETE: CPT

## 2024-10-03 PROCEDURE — 85027 COMPLETE CBC AUTOMATED: CPT

## 2024-10-03 RX ORDER — CLOPIDOGREL BISULFATE 75 MG/1
75 TABLET ORAL DAILY
Qty: 21 TABLET | Refills: 0 | Status: SHIPPED | OUTPATIENT
Start: 2024-10-03 | End: 2024-10-24

## 2024-10-03 RX ORDER — ASPIRIN 81 MG/1
81 TABLET ORAL DAILY
Qty: 21 TABLET | Refills: 0 | Status: SHIPPED | OUTPATIENT
Start: 2024-10-03 | End: 2024-10-24

## 2024-10-03 RX ORDER — PANTOPRAZOLE SODIUM 40 MG/1
40 TABLET, DELAYED RELEASE ORAL
Qty: 90 TABLET | Refills: 1 | Status: SHIPPED | OUTPATIENT
Start: 2024-10-03

## 2024-10-03 RX ORDER — SELENIUM 50 MCG
1 TABLET ORAL DAILY
Qty: 90 CAPSULE | Refills: 1 | Status: SHIPPED | OUTPATIENT
Start: 2024-10-03 | End: 2025-04-01

## 2024-10-03 RX ADMIN — CLOPIDOGREL BISULFATE 75 MG: 75 TABLET ORAL at 08:20

## 2024-10-03 RX ADMIN — ENOXAPARIN SODIUM 40 MG: 100 INJECTION SUBCUTANEOUS at 08:21

## 2024-10-03 RX ADMIN — SODIUM CHLORIDE, PRESERVATIVE FREE 10 ML: 5 INJECTION INTRAVENOUS at 08:24

## 2024-10-03 ASSESSMENT — PAIN SCALES - GENERAL
PAINLEVEL_OUTOF10: 0

## 2024-10-03 NOTE — DISCHARGE SUMMARY
Discharge Summary    Name: Steven Fabian  [id number]  YOB: 1995 (Age: 29 y.o.)   Date of Admission: 10/2/2024  Date of Discharge: 10/3/2024  Attending Physician: Jose Young MD    Discharge Diagnosis:   Principal Problem:    Paresthesia  Resolved Problems:    * No resolved hospital problems. *       Consultations:  IP CONSULT TO TELE-NEUROLOGY  IP CONSULT TO TELE-NEUROLOGY      Brief Admission History/Reason for Admission Per Billie Lopez MD:   Paraesthesia     Brief Hospital Course by Main Problems:   Steven Fabian is a 29 y.o. man without significant past medical history except Vicco disease with a chronically elevated total bili, not on any medications who presented to the emergency room with paresthesias.  Patient says the first episode happened early morning of 10/02/2024 where he noticed some vision blurring.  He said when he closed one eye and then the other eye his vision was fine, but with both eyes open that it seemed blurry. Denies double vision.  After this occurred he noted some lip numbness. He says only the upper lip became numb and it was on both sides of the face and then he had the onset of paresthesias down his right arm. No obvious apraxia or weakness and no involvement of his leg.  This lasted for about 10 minutes but then reoccurred later here in the ED lasting about 5 minutes.   In the ED, he was afebrile hemodynamically stable and not hypoxic.  Blood pressure is trending on the high side with systolics in the 150s to 160s and he does have tachycardia in the 100-110s.  Lab work showed a sodium of 142, potassium of 3.8, creatinine of 1.0, glucose of 109, total bili of 1.8.  White count of 6.8, hemoglobin of 15.  EKG normal sinus rhythm. Head CT negative for acute intracranial abnormality. CTA head/neck and CT brain perfusion noted large apparent perfusion defect in the left posterior frontal, parietal, and occipital lobes. No  (includes going over instructions, follow-up, prescriptions, and preparing report for sign off to her PCP) :  35 minutes

## 2024-10-03 NOTE — PROGRESS NOTES
OT eval order received and acknowledged. Per PT screen PT demonstrating baseline independence for self care tasks and functional mobility/transfers. OT evaluation order will therefore be discontinued this pt has no acute OT needs. Please reorder OT if pt's functional status changes. Thank you.

## 2024-10-03 NOTE — PROGRESS NOTES
PT eval order received and acknowledged. Pt screened and is currently presenting with baseline independence for all functional mobility/transfers. Pt reports that numbness/tingling present on admission has improved. Pt reports new onset blurry vision prior to admission, not present on PT eval. Pt also reports hx of migraines as a child however reports that he \"grew out of it\". BP slightly elevated 142/90 seated, 148/97 standing, pt reports that he \"runs high\". Pt educated on BEFAST and provided with printed handout, pt verbalized understanding. PT evaluation order will be discontinued at this time as pt has no acute PT needs. Please reorder PT if pt functional status changes. Thank you.    [facility] AM-PAC™ “6 Clicks”         Basic Mobility Inpatient Short Form  How much difficulty does the patient currently have... Unable A Lot A Little None   1.  Turning over in bed (including adjusting bedclothes, sheets and blankets)?   [] 1   [] 2   [] 3   [x] 4   2.  Sitting down on and standing up from a chair with arms ( e.g., wheelchair, bedside commode, etc.)   [] 1   [] 2   [] 3   [x] 4   3.  Moving from lying on back to sitting on the side of the bed?   [] 1   [] 2   [] 3   [x] 4          How much help from another person does the patient currently need... Total A Lot A Little None   4.  Moving to and from a bed to a chair (including a wheelchair)?   [] 1   [] 2   [] 3   [x] 4   5.  Need to walk in hospital room?   [] 1   [] 2   [] 3   [x] 4   6.  Climbing 3-5 steps with a railing?   [] 1   [] 2   [] 3   [x] 4   © 2007, Trustees of [facility], under license to [employer]. All rights reserved     Score:  Initial: 24/24 Most Recent: X (Date: 10/3/2024 )   Interpretation of Tool:  Represents activities that are increasingly more difficult (i.e. Bed mobility, Transfers, Gait).  Score 24 23 22-20 19-15 14-10 9-7 6   Modifier CH CI CJ CK CL CM CN

## 2024-10-03 NOTE — PLAN OF CARE
Problem: Discharge Planning  Goal: Discharge to home or other facility with appropriate resources  Outcome: Progressing     Problem: Pain  Goal: Verbalizes/displays adequate comfort level or baseline comfort level  10/2/2024 2123 by Ben Adan, RN  Outcome: Progressing  10/2/2024 1814 by Ritika Zamarripa, RN  Outcome: Progressing

## 2024-10-03 NOTE — PLAN OF CARE
Problem: Discharge Planning  Goal: Discharge to home or other facility with appropriate resources  10/3/2024 0957 by Tamara Ogden RN  Outcome: Progressing  Flowsheets (Taken 10/3/2024 0954)  Discharge to home or other facility with appropriate resources: Identify barriers to discharge with patient and caregiver  10/2/2024 2123 by Ben Adan RN  Outcome: Progressing  Flowsheets (Taken 10/2/2024 2020 by Meghan Holliday, RN)  Discharge to home or other facility with appropriate resources: Identify barriers to discharge with patient and caregiver     Problem: Pain  Goal: Verbalizes/displays adequate comfort level or baseline comfort level  10/3/2024 0957 by Tamara Ogden RN  Outcome: Progressing  Flowsheets (Taken 10/3/2024 0829)  Verbalizes/displays adequate comfort level or baseline comfort level: Encourage patient to monitor pain and request assistance  10/2/2024 2123 by Ben Adan RN  Outcome: Progressing  Flowsheets (Taken 10/2/2024 2117 by Meghan Holliday, RN)  Verbalizes/displays adequate comfort level or baseline comfort level: Encourage patient to monitor pain and request assistance     Problem: Safety - Adult  Goal: Free from fall injury  Outcome: Progressing  Flowsheets (Taken 10/3/2024 0953)  Free From Fall Injury: Instruct family/caregiver on patient safety     Problem: ABCDS Injury Assessment  Goal: Absence of physical injury  Outcome: Progressing  Flowsheets (Taken 10/3/2024 0953)  Absence of Physical Injury: Implement safety measures based on patient assessment

## 2024-10-03 NOTE — CONSULTS
General-rounding tele-neurology consult called in to [facility] call center at this time r/t CTA results.

## 2024-10-03 NOTE — CARE COORDINATION
Transition of Care Plan:    RUR: N/A - OBS  Prior Level of Functioning: independent  Disposition: Home  If SNF or IPR: Date FOC offered: N/A  Date FOC received: N/A  Accepting facility: N/A  Date authorization started with reference number: N/A  Date authorization received and expires: N/A  Follow up appointments: Yes  DME needed: None  Transportation at discharge: Family  IM/IMM Medicare/ letter given: Yes  Is patient a [place] and connected with [facility]? N/A   If yes, was [place] transfer form completed and [facility] notified?   Caregiver Contact: Family  Discharge Caregiver contacted prior to discharge?   Care Conference needed? No  Barriers to discharge: None

## 2024-10-03 NOTE — PROGRESS NOTES
For discharge today as planned, documents handed and instructed to patient. IV cath removed, no sighs of phlebitis noted. No complaints prior to discharge.

## 2024-10-03 NOTE — PROGRESS NOTES
Consult received, chart reviewed, spoke with nsg. Screen completed. Pt admitted for CVA workup due to paresthesias. Pt has passed nsg dysphagia screen and is tolerating reg/thin diet without difficulty. No concerns for speech or lang per nsg. MRI with results of normal brain. No acute needs for SLP intervention identified at this time. DC SLP and please reconsult as medically appropriate.

## 2024-10-04 PROBLEM — R20.0 NUMBNESS AND TINGLING: Status: ACTIVE | Noted: 2024-10-02

## 2024-10-04 LAB
EKG ATRIAL RATE: 93 BPM
EKG DIAGNOSIS: NORMAL
EKG P AXIS: 40 DEGREES
EKG P-R INTERVAL: 148 MS
EKG Q-T INTERVAL: 352 MS
EKG QRS DURATION: 90 MS
EKG QTC CALCULATION (BAZETT): 437 MS
EKG R AXIS: 29 DEGREES
EKG T AXIS: -20 DEGREES
EKG VENTRICULAR RATE: 93 BPM

## 2024-10-04 NOTE — CONSULTS
HPI:   A teleneurology consultation was requested JOCELYN Ibarra and undertaken with the consent of the patient and his wife who is at the bedside.  Teleneurology evaluation was assisted with by the nurse liaison, Nano Cooper RN.    This 29-year-old man is right-handed.  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  As a child he had migraines but has not had any for years.  He denies tobacco use.    On 10/2/2024 at approximately 1330 he began having a change in his vision.  He has described this variably as blurry vision, tunnel vision and a problem with depth perception.  He felt that it went away if he covers 1 eye or the other but was only present if both eyes were in use, but denied any diplopia.  He also described that his upper lip became numb bilaterally.  The symptoms lasted perhaps 45 minutes.  He also developed some right arm numbness which lasted on 2 separate occasions for total of approximately 20 minutes.    He denies a migraine headache but does recall developing some pressure behind his left eye which was transient.    He was seen by the emergency teleneurology service and had an NIH stroke scale score of 0.  He was admitted for further evaluation of possible stroke on dual antiplatelet therapy with aspirin 81 mg and clopidogrel 75 mg.  He was started on high-dose atorvastatin.    Noncontrast CT of the head was unrevealing.  CT angiogram revealed no flow-limiting vascular abnormalities.  However CT perfusion showed flow abnormalities within the left hemisphere.    Neuro ROS  As above, currently symptom-free    PMH: As above  PSH: History reviewed. No pertinent surgical history.   Allergies: None  FH: History reviewed. No pertinent family history.   SH:   Social History       Tobacco History       Smoking Status  Never      Smokeless Tobacco Use  Never              Alcohol History       Alcohol Use Status  Not Asked              Drug Use       Drug Use  Moderate   2-Severe to Total   BEST LANGUAGE  0-No Aphasia   2-Severe Aphasia   3-Mute  DYSARTHRIA  0-Normal   1-Mild to Moderate   2-Severe  EXTINCTION/INATTENTION  1-One Modality   2-Two+ Modalities  NIH Stroke Scale Score: 0    Labs: Hemoglobin A1c was normal, lipid profile shows slightly elevated cholesterol and LDL    Imaging: MRI of the brain reviewed.  No acute process    Impression/Recommendations:   1.  Paresthesias: Patient has risk factors for stroke of high blood pressure and slightly elevated cholesterol.  However, this is more likely related to migraine given his past history and development of a left retro-orbital pressure pain.  He requires further evaluation for clotting abnormalities before discontinuing antiplatelet medication.  2.  Abnormal brain scan: CT perfusion abnormalities can be seen with migraine.  Given the extent of the perfusion abnormalities, the lack of a significant vascular occlusion and the lack of any residual finding on MRI of the brain or any residual symptoms it is unlikely to have represented an acute ischemic stroke.  3.  Visual disturbance: His description is unclear and likely relates to migraine along with the paresthesias he described more clearly.    -Continue dual antiplatelet therapy with 81 mg aspirin and 75 mg clopidogrel for 3 weeks and then aspirin monotherapy  -Continue atorvastatin  -Given the patient's age, a thrombophilia evaluation is recommended which may include sedimentation rate, CRP, protein S, protein C, Antithrombin III, homocystine level, cardiolipin antibody panel, Leiden factor V screen, vitamin B12 level, TSH and lupus anticoagulant  -Patient should follow-up as soon as possible with a community neurologist who may determine the need for the patient to continue antiplatelet and statin therapy.    Thank you for allowing the Tele-Neurology Service to consult on this patient.  Neurology will sign off, but remains available should any change in history